# Patient Record
Sex: MALE | Race: WHITE
[De-identification: names, ages, dates, MRNs, and addresses within clinical notes are randomized per-mention and may not be internally consistent; named-entity substitution may affect disease eponyms.]

---

## 2019-03-05 ENCOUNTER — HOSPITAL ENCOUNTER (EMERGENCY)
Dept: HOSPITAL 97 - ER | Age: 64
Discharge: HOME | End: 2019-03-05
Payer: COMMERCIAL

## 2019-03-05 DIAGNOSIS — Z72.0: ICD-10-CM

## 2019-03-05 DIAGNOSIS — L02.31: Primary | ICD-10-CM

## 2019-03-05 DIAGNOSIS — F31.9: ICD-10-CM

## 2019-03-05 PROCEDURE — 99284 EMERGENCY DEPT VISIT MOD MDM: CPT

## 2019-03-05 PROCEDURE — 87070 CULTURE OTHR SPECIMN AEROBIC: CPT

## 2019-03-05 PROCEDURE — 0J990ZZ DRAINAGE OF BUTTOCK SUBCUTANEOUS TISSUE AND FASCIA, OPEN APPROACH: ICD-10-PCS

## 2019-03-05 PROCEDURE — 87205 SMEAR GRAM STAIN: CPT

## 2019-03-05 NOTE — EDPHYS
Physician Documentation                                                                           

 [facility]                                                                

Name: René Choudhary                                                                                  

Age: 63 yrs                                                                                       

Sex: Male                                                                                         

: 1955                                                                                   

MRN: [id number]                                                                                   

Arrival Date: 2019                                                                          

Time: 06:44                                                                                       

Account#: [id number]                                                                            

Bed 15                                                                                            

Private MD:                                                                                       

ED Physician Dillon Duran                                                                      

HPI:                                                                                              

                                                                                             

07:05 This 63 yrs old  Male presents to ER via Ambulatory with complaints of Boil.   kb  

07:05 The patient presents with an abscess of the gluteal cleft. Description: erythematous,   kb  

      fluctuant, swollen, warm. Onset: The symptoms/episode began/occurred 6 month(s) ago.        

      Possible cause(s): unknown. Associated signs and symptoms: Pertinent positives:             

      erythema, swelling, Pertinent negatives: discharge, drainage, foreign body sensation,       

      fever, headache, nausea, shortness of breath, vomiting. Modifying factors: the symptoms     

      are alleviated by nothing, the symptoms are aggravated by touching. Severity of             

      symptoms: At their worst the symptoms were moderate, in the emergency department the        

      symptoms are unchanged. The patient has not experienced similar symptoms in the past.       

      The patient has not recently seen a physician. "I have a boil on my ass. It's been          

      moving up and down the crack of my ass for 6 months. Now it is on the right side and it     

      started to hurt.".                                                                          

                                                                                                  

Historical:                                                                                       

- Allergies:                                                                                      

06:59 No Known Allergies;                                                                     bb  

- Home Meds:                                                                                      

06:59 Lamictal Oral [Active]; Zyprexa Oral [Active]; ibuprofen Oral [Active]; Lithium         bb  

      Carbonate Oral [Active]; Trazodone Oral [Active];                                           

- PMHx:                                                                                           

06:59 Bipolar disorder;                                                                       bb  

07:00 Anxiety;                                                                                rb1 

- PSHx:                                                                                           

06:59 Hernia repair;                                                                          bb  

                                                                                                  

- Immunization history:: Adult Immunizations up to date.                                          

- Social history:: Smoking status: Patient uses tobacco products, denies chronic                  

  smoking, but will smoke occasionally, Patient/guardian denies using alcohol.                    

- Ebola Screening: : No symptoms or risks identified at this time.                                

                                                                                                  

                                                                                                  

ROS:                                                                                              

07:03 Constitutional: Negative for fever, chills, and weight loss, Cardiovascular: Negative   kb  

      for chest pain, palpitations, and edema, Respiratory: Negative for shortness of breath,     

      cough, wheezing, and pleuritic chest pain, Abdomen/GI: Negative for abdominal pain,         

      nausea, vomiting, diarrhea, and constipation, MS/Extremity: Negative for injury and         

      deformity, Neuro: Negative for headache, weakness, numbness, tingling, and seizure.         

07:03 Skin: Positive for abscess, of the gluteal cleft.                                           

                                                                                                  

Exam:                                                                                             

07:03 Constitutional:  This is a well developed, well nourished patient who is awake, alert,  kb  

      and in no acute distress. Head/Face:  Normocephalic, atraumatic. Chest/axilla:  Normal      

      chest wall appearance and motion.  Nontender with no deformity.  No lesions are             

      appreciated. Cardiovascular:  Regular rate and rhythm with a normal S1 and S2.  No          

      gallops, murmurs, or rubs.  Normal PMI, no JVD.  No pulse deficits. Respiratory:  Lungs     

      have equal breath sounds bilaterally, clear to auscultation and percussion.  No rales,      

      rhonchi or wheezes noted.  No increased work of breathing, no retractions or nasal          

      flaring. Abdomen/GI:  Soft, non-tender, with normal bowel sounds.  No distension or         

      tympany.  No guarding or rebound.  No evidence of tenderness throughout. MS/ Extremity:     

       Pulses equal, no cyanosis.  Neurovascular intact.  Full, normal range of motion.           

      Neuro:  Awake and alert, GCS 15, oriented to person, place, time, and situation.            

      Cranial nerves II-XII grossly intact.  Motor strength 5/5 in all extremities.  Sensory      

      grossly intact.  Cerebellar exam normal.  Normal gait.                                      

07:03 Skin: abscess, that is moderate sized, of the gluteal cleft, with fluctuance, that is       

      moderate.                                                                                   

                                                                                                  

Vital Signs:                                                                                      

06:59  / 69; Pulse 72; Resp 16 S; Temp 98.1(O); Pulse Ox 98% on R/A; Weight 68.04 kg    bb  

      (R); Height 5 ft. 5 in. (165.10 cm) (R); Pain 5/10;                                         

08:15  / 73; Pulse 68; Resp 17; Pulse Ox 99% on R/A;                                    rb1 

06:59 Body Mass Index 24.96 (68.04 kg, 165.10 cm)                                             bb  

                                                                                                  

Procedures:                                                                                       

08:21 I \T\ D: Incision and drainage was performed for an abscess of the right gluteal cleft    kb

      Prepped with Betadine, Anesthetized with 2 ml's 1% Lidocaine. Incised with #11 blade.       

      Drained moderate amount purulent fluid. Packed with iodoform gauze, Dressing: sterile       

      4x4 gauze, the patient tolerated the procedure well.                                        

                                                                                                  

MDM:                                                                                              

06:56 Patient medically screened.                                                             kb  

07:03 Data reviewed: vital signs, nurses notes. Data interpreted: Pulse oximetry: on room air kb  

      is 98 %. Interpretation: normal.                                                            

07:38 ED course: Pt wants to wait for wife to arrive before I\T\D.                              kb

08:22 Counseling: I had a detailed discussion with the patient and/or guardian regarding: the kb  

      historical points, exam findings, and any diagnostic results supporting the                 

      discharge/admit diagnosis, the need for outpatient follow up, a general surgeon, to         

      return to the emergency department if symptoms worsen or persist or if there are any        

      questions or concerns that arise at home.                                                   

                                                                                                  

                                                                                             

08:22 Order name: Wound Culture                                                               kb  

                                                                                             

07:03 Order name: I\T\D Setup; Complete Time: 07:30                                             kb

                                                                                                  

Administered Medications:                                                                         

07:10 Drug: Bactrim (160 mg-800 mg (DS) 1 tablet Route: PO;                                   rb1 

07:35 Follow up: Response: No adverse reaction                                                rb1 

07:10 Drug: KeFLEX 500 mg Route: PO;                                                          rb1 

07:35 Follow up: Response: No adverse reaction                                                rb1 

07:20 CANCELLED (Other Intervention Used): Ativan 1 mg PO once                                kb  

07:22 Drug: Lidocaine (1 %) 5 mg Route: Infiltration;                                         rb1 

07:23 Drug: Ativan 0.5 mg Route: PO;                                                          rb1 

08:00 Follow up: Response: No adverse reaction; Anxiety decreased                             rb1 

                                                                                                  

                                                                                                  

Disposition:                                                                                      

19 08:23 Discharged to Home. Impression: Cutaneous abscess of buttock.                      

- Condition is Stable.                                                                            

- Discharge Instructions: Skin Abscess, Easy-to-Read, Incision and Drainage, Care After.          

- Prescriptions for Keflex 500 mg Oral Capsule - take 1 capsule by ORAL route every 8             

  hours for 10 days; 30 capsule. Bactrim - 160 mg Oral Tablet - take 1 tablet by            

  ORAL route every 12 hours for 10 days; 20 tablet.                                               

- Medication Reconciliation Form, Thank You Letter, Antibiotic Education, Prescription            

  Opioid Use form.                                                                                

- Follow up: Emergency Department; When: As needed; Reason: Worsening of condition.               

  Follow up: Private Physician; When: 2 - 3 days; Reason: Recheck today's complaints,             

  Continuance of care, Re-evaluation by your physician.                                           

                                                                                                  

                                                                                                  

                                                                                                  

Addendum:                                                                                         

2019                                                                                        

     11:15 Co-signature as Attending Physician, Dillon Duran MD I agree with the assessment and  c
ha

           plan of care.                                                                          

                                                                                                  

Signatures:                                                                                       

Dispatcher [employer]                           EDEva Finnegan, SUGAR-LAKEISHA WOOTEN-Dillon Salazar MD MD cha Ballard, Brenda, RN                     RN   bb                                                   

Apple Montero, RN                     RN   rb1                                                  

                                                                                                  

Corrections: (The following items were deleted from the chart)                                    

                                                                                             

07:20 07:13 Ativan 1 mg PO once ordered. kb                                                   kb  

08:42 08:23 2019 08:23 Discharged to Home. Impression: Cutaneous abscess of buttock.    rb1 

      Condition is Stable. Forms are Medication Reconciliation Form, Thank You Letter,            

      Antibiotic Education, Prescription Opioid Use. Follow up: Emergency Department; When:       

      As needed; Reason: Worsening of condition. Follow up: Private Physician; When: 2 - 3        

      days; Reason: Recheck today's complaints, Continuance of care, Re-evaluation by your        

      physician. kb                                                                               

                                                                                                  

**************************************************************************************************

## 2019-03-05 NOTE — ER
Nurse's Notes                                                                                     

 [facility]                                                                

Name: René Choudhary                                                                                  

Age: 63 yrs                                                                                       

Sex: Male                                                                                         

: 1955                                                                                   

MRN: [id number]                                                                                   

Arrival Date: 2019                                                                          

Time: 06:44                                                                                       

Account#: [id number]                                                                            

Bed 15                                                                                            

Private MD:                                                                                       

Diagnosis: Cutaneous abscess of buttock                                                           

                                                                                                  

Presentation:                                                                                     

                                                                                             

06:56 Presenting complaint: Patient states: he has had a boil on his buttocks for approx 6    bb  

      months which seems to have moved around and settled on his right sided gluteal cleft pt     

      states it was not hurting before but has now started hurting which is why he came in        

      now. Transition of care: patient was not received from another setting of care. Onset       

      of symptoms is unknown. Risk Assessment: Do you want to hurt yourself or someone else?      

      Patient reports no desire to harm self or others. Initial Sepsis Screen: Does the           

      patient meet any 2 criteria? No. Patient's initial sepsis screen is negative. Does the      

      patient have a suspected source of infection? No. Patient's initial sepsis screen is        

      negative. Care prior to arrival: None.                                                      

06:56 Method Of Arrival: Ambulatory                                                           bb  

06:56 Acuity: CARLY 4                                                                           bb  

                                                                                                  

Historical:                                                                                       

- Allergies:                                                                                      

06:59 No Known Allergies;                                                                     bb  

- Home Meds:                                                                                      

06:59 Lamictal Oral [Active]; Zyprexa Oral [Active]; ibuprofen Oral [Active]; Lithium         bb  

      Carbonate Oral [Active]; Trazodone Oral [Active];                                           

- PMHx:                                                                                           

06:59 Bipolar disorder;                                                                       bb  

07:00 Anxiety;                                                                                rb1 

- PSHx:                                                                                           

06:59 Hernia repair;                                                                          bb  

                                                                                                  

- Immunization history:: Adult Immunizations up to date.                                          

- Social history:: Smoking status: Patient uses tobacco products, denies chronic                  

  smoking, but will smoke occasionally, Patient/guardian denies using alcohol.                    

- Ebola Screening: : No symptoms or risks identified at this time.                                

                                                                                                  

                                                                                                  

Screenin:00 Abuse screen: Denies threats or abuse. Nutritional screening: No deficits noted.        rb1 

      Tuberculosis screening: No symptoms or risk factors identified. Fall Risk None              

      identified.                                                                                 

                                                                                                  

Assessment:                                                                                       

07:00 General: Appears comfortable, Behavior is anxious, Denies fever. Pain: Complains of     rb1 

      pain in buttocks Pain currently is 4 out of 10 on a pain scale. Neuro: Level of             

      Consciousness is awake, alert, obeys commands, Oriented to person, place, time,             

      situation. Cardiovascular: Capillary refill < 3 seconds is brisk in bilateral fingers.      

      Respiratory: Airway is patent Respiratory effort is even, unlabored, Respiratory            

      pattern is regular, symmetrical. GI: No signs and/or symptoms were reported involving       

      the gastrointestinal system. : No signs and/or symptoms were reported regarding the       

      genitourinary system. Derm: Skin is pink, warm \T\ dry. Pt. reports having a boil on his    

      right buttocks. Musculoskeletal: Range of motion: intact in all extremities.                

08:00 Reassessment: Wife arrived to drive the pt. home due to the Ativan 0.5 mg PO x once.    rb1 

08:00 Reassessment: Patient appears in no apparent distress at this time. Patient and/or      rb1 

      family updated on plan of care and expected duration. Pain level reassessed. Patient is     

      alert, oriented x 3, equal unlabored respirations, skin warm/dry/pink. Pt. reports          

      feeling less anxious.                                                                       

08:17 Reassessment: Provider at bedside performing I \T\ D.                                     rb1

                                                                                                  

Vital Signs:                                                                                      

06:59  / 69; Pulse 72; Resp 16 S; Temp 98.1(O); Pulse Ox 98% on R/A; Weight 68.04 kg    bb  

      (R); Height 5 ft. 5 in. (165.10 cm) (R); Pain 5/10;                                         

08:15  / 73; Pulse 68; Resp 17; Pulse Ox 99% on R/A;                                    rb1 

06:59 Body Mass Index 24.96 (68.04 kg, 165.10 cm)                                               

                                                                                                  

ED Course:                                                                                        

06:44 Patient arrived in ED.                                                                  es  

06:56 Eva Calderon FNP-C is PHCP.                                                        kb  

06:56 Dillon Duran MD is Attending Physician.                                             kb  

06:57 Triage completed.                                                                       bb  

06:59 Arm band placed on Patient placed in an exam room, on a stretcher.                      bb  

07:00 Patient has correct armband on for positive identification. Placed in gown. Bed in low  rb1 

      position. Call light in reach. Side rails up X 1. Pulse ox on. NIBP on.                     

07:06 Pamela Vera is Primary Nurse.                                                      cc3 

08:26 Assist provider with I \T\ D: of an abscess on Set up I\T\D tray. Performed by Eva      
5

      Kvng FNP-C Culture sent to lab. Wound packed. 4X4s, Dressing with Patient tolerated      

      well.                                                                                       

08:27 Wound Culture Sent.                                                                     [facility] 

08:42 Patient did not have IV access during this emergency room visit.                        rb1 

                                                                                                  

Administered Medications:                                                                         

07:10 Drug: Bactrim (160 mg-800 mg (DS) 1 tablet Route: PO;                                   rb1 

07:35 Follow up: Response: No adverse reaction                                                rb1 

07:10 Drug: KeFLEX 500 mg Route: PO;                                                          rb1 

07:35 Follow up: Response: No adverse reaction                                                rb1 

07:20 CANCELLED (Other Intervention Used): Ativan 1 mg PO once                                kb  

07:22 Drug: Lidocaine (1 %) 5 mg Route: Infiltration;                                         rb1 

07:23 Drug: Ativan 0.5 mg Route: PO;                                                          rb1 

08:00 Follow up: Response: No adverse reaction; Anxiety decreased                             rb1 

                                                                                                  

                                                                                                  

Outcome:                                                                                          

08:23 Discharge ordered by MD.                                                                kb  

08:42 Patient left the ED.                                                                    rb1 

08:42 Discharged to home ambulatory, with family.                                             rb1 

08:42 Condition: stable                                                                           

08:42 Discharge instructions given to patient, Instructed on discharge instructions, follow       

      up and referral plans. medication usage, Demonstrated understanding of instructions,        

      follow-up care, medications, Prescriptions given X 2.                                       

                                                                                                  

Signatures:                                                                                       

Eva Calderon, FNP-C                 FNP-Ckb                                                   

Emiliana Riojas Brenda, RN                     RN   bb                                                   

Apple Montero, RN                     RN   Kimberley Francisco                              Pamela Patterson                             3                                                  

                                                                                                  

**************************************************************************************************

## 2019-03-08 LAB
BUN BLD-MCNC: 16 MG/DL (ref 7–18)
GLUCOSE SERPLBLD-MCNC: 183 MG/DL (ref 74–106)
HCT VFR BLD CALC: 46.9 % (ref 39.6–49)
LYMPHOCYTES # SPEC AUTO: 1.8 K/UL (ref 0.7–4.9)
PMV BLD: 7.7 FL (ref 7.6–11.3)
POTASSIUM SERPL-SCNC: 4.2 MMOL/L (ref 3.5–5.1)
RBC # BLD: 5.04 M/UL (ref 4.33–5.43)

## 2019-03-08 NOTE — EKG
Test Date:    2019-03-08               Test Time:    14:19:41

Technician:   RICHARD                                     

                                                     

MEASUREMENT RESULTS:                                       

Intervals:                                           

Rate:         65                                     

MO:           138                                    

QRSD:         102                                    

QT:           380                                    

QTc:          395                                    

Axis:                                                

P:            65                                     

MO:           138                                    

QRS:          60                                     

T:            51                                     

                                                     

INTERPRETIVE STATEMENTS:                                       

                                                     

Normal sinus rhythm

Normal ECG

No previous ECG available for comparison



Electronically Signed On 03-08-19 15:35:04 CST by Chris Roijas

## 2019-03-08 NOTE — RAD REPORT
EXAM DESCRIPTION:  RAD - Chest Pa And Lat (2 Views) - 3/8/2019 2:42 pm

 

CLINICAL HISTORY:  pre op

Chest pain.

 

COMPARISON:  No comparisons

 

FINDINGS:  Diffuse COPD is present. Subtle linear opacities are seen in the left lung base posteriorl
y, likely representing atelectasis. An area of aspiration is also possible. The heart is upper limit 
normal in size. No displaced fractures.

## 2019-03-11 ENCOUNTER — HOSPITAL ENCOUNTER (OUTPATIENT)
Dept: HOSPITAL 97 - OR | Age: 64
Discharge: HOME | End: 2019-03-11
Attending: SURGERY
Payer: COMMERCIAL

## 2019-03-11 DIAGNOSIS — K61.0: Primary | ICD-10-CM

## 2019-03-11 DIAGNOSIS — L90.5: ICD-10-CM

## 2019-03-11 DIAGNOSIS — K64.8: ICD-10-CM

## 2019-03-11 DIAGNOSIS — K64.4: ICD-10-CM

## 2019-03-11 PROCEDURE — 87205 SMEAR GRAM STAIN: CPT

## 2019-03-11 PROCEDURE — 36415 COLL VENOUS BLD VENIPUNCTURE: CPT

## 2019-03-11 PROCEDURE — 93005 ELECTROCARDIOGRAM TRACING: CPT

## 2019-03-11 PROCEDURE — 46050 I&D PERIANAL ABSCESS SUPFC: CPT

## 2019-03-11 PROCEDURE — 80048 BASIC METABOLIC PNL TOTAL CA: CPT

## 2019-03-11 PROCEDURE — 71046 X-RAY EXAM CHEST 2 VIEWS: CPT

## 2019-03-11 PROCEDURE — 85025 COMPLETE CBC W/AUTO DIFF WBC: CPT

## 2019-03-11 PROCEDURE — 88304 TISSUE EXAM BY PATHOLOGIST: CPT

## 2019-03-11 PROCEDURE — 87075 CULTR BACTERIA EXCEPT BLOOD: CPT

## 2019-03-11 PROCEDURE — 87070 CULTURE OTHR SPECIMN AEROBIC: CPT

## 2019-03-11 PROCEDURE — 0D9Q3ZX DRAINAGE OF ANUS, PERCUTANEOUS APPROACH, DIAGNOSTIC: ICD-10-PCS

## 2019-03-11 NOTE — P.BOP
Preoperative diagnosis: perianal abscess


Postoperative diagnosis: same


Primary procedure: EUA, anoscopy, rigid proctoscopy, incision and drainage of 

perianal abscess


Estimated blood loss: <10cc


Specimen: pus and devitalized tissue


Findings: perianal abscess right posterolateral


Anesthesia: General


Complications: None


Transferred to: Recovery Room


Condition: Good

## 2019-03-12 NOTE — OP
Date of Procedure:  03/11/2019



Surgeon:  Bobby Calderon MD



Preoperative Diagnosis:  Perianal abscess.



Postoperative Diagnosis:  Perianal abscess.



Procedure:  Examination under anesthesia, anoscopy, and rigid proctoscopy incision and drainage of pe
rianal abscess.



Specimen:  Pus and devitalized tissue.



Findings:  Perianal abscess, right posterolateral.



Anesthesia:  General plus local.



Indications:  This is the case of a 63-year-old patient, comes to us with a perianal abscess.  The pa
dorothy went to the ER, received I and D of that area, but the wound was closed within 24 hours.  The s
welling in the foot is so inside with tenderness and he was offered EUA, anoscopy, proctoscopy, incis
ion and drainage of perianal abscess.  Once again, benefit, alternative, and risks of EUA, anoscopy, 
proctoscopy, incision and drainage of perianal abscess were fully explained, which include but are no
t limited to infection, bleeding, damage to adjacent structures, anesthesia complication, anal strict
ure and incontinence, bowel perforation, recurrence, MI, and even death.  He also understands this ma
y not relieve any symptoms, he might need more than one surgical intervention.  He understands he clara
l require wound care.



Procedure In Detail:  The patient was brought to operating room, placed in supine position.  Anesthes
ia was done without complication.  The patient was placed in lithotomy total position with proper pro
tection.  The area of the previous scar was noticed.  That I and D was closed.  Induration and fluctu
ance were still present.  We placed a rigid proctoscope in the area of the rectum all the way about t
o 10-12 cm limited by the amount of stools present up to that area.  We did not see any connection of
 this abscess until the rectum, we did not see any fistula or masses.  The scope was removed.  An ano
scope was placed with a window on the side once again to verify any connections with noticed internal
 and external hemorrhoids, but no tumors and no fistula.  At that moment, I proceeded to remove that,
 make an incision over the area of the fluctuance he has already previously marked.  Incision was car
ried down deep into the buttocks and perianal region when we found this cavity.  The cavity was found
, cultured, and irrigated.  Loculation was explored and packed with iodoform quarter of an inch.  The
 patient tolerated the procedure well.  Hemostasis was obtained.  Local anesthesia was applied after 
irrigation.  The patient was sent to recovery in stable condition.





CHINO/KAMALA

DD:  03/11/2019 14:26:15Voice ID:  [id number]

DT:  03/12/2019 01:58:38Report ID:  [id number]

## 2019-03-12 NOTE — DS
Date of Discharge:  03/11/2019



Diagnosis:  Perianal abscess.



Procedure:  EUA, anoscopy, rigid proctoscopy, incision and drainage of perianal abscess.



Disposition:  Home.



Discharge Instructions:  Activity as tolerated.  No heavy lifting. Follow up in my office in 1 week. 
Call for appointment, [phone].  Packing is iodoform quarter of an inch daily. Follow up in my office
 in 1week.  For medications, the patient is already taking antibiotics from home, so we are going to 
give him a Vicodin for pain and saline for dressing changes.





CHINO/KAMALA

DD:  03/11/2019 14:26:15Voice ID:  [id number]

DT:  03/12/2019 02:00:00Report ID:  [id number]

## 2020-01-27 ENCOUNTER — HOSPITAL ENCOUNTER (EMERGENCY)
Dept: HOSPITAL 97 - ER | Age: 65
Discharge: HOME | End: 2020-01-27
Payer: COMMERCIAL

## 2020-01-27 VITALS — SYSTOLIC BLOOD PRESSURE: 131 MMHG | DIASTOLIC BLOOD PRESSURE: 78 MMHG

## 2020-01-27 VITALS — OXYGEN SATURATION: 100 % | TEMPERATURE: 98 F

## 2020-01-27 DIAGNOSIS — G51.0: Primary | ICD-10-CM

## 2020-01-27 DIAGNOSIS — F31.9: ICD-10-CM

## 2020-01-27 PROCEDURE — 70551 MRI BRAIN STEM W/O DYE: CPT

## 2020-01-27 PROCEDURE — 96374 THER/PROPH/DIAG INJ IV PUSH: CPT

## 2020-01-27 PROCEDURE — 99284 EMERGENCY DEPT VISIT MOD MDM: CPT

## 2020-01-27 PROCEDURE — 96375 TX/PRO/DX INJ NEW DRUG ADDON: CPT

## 2020-01-27 PROCEDURE — 70450 CT HEAD/BRAIN W/O DYE: CPT

## 2020-01-27 NOTE — RAD REPORT
EXAM DESCRIPTION:  CT - Ct Stroke Brain Wo Cont - 1/27/2020 4:52 pm

 

CLINICAL HISTORY:  facial droop

Headache, drowsiness, CVA

 

COMPARISON:  No comparisons

 

TECHNIQUE:  All CT scans are performed using dose optimization technique as appropriate and may inclu
de automated exposure control or mA/KV adjustment according to patient size.

 

FINDINGS:  No intracranial hemorrhage, hydrocephalus or extra-axial fluid collection.5 mm area of dim
inished density right basal ganglia is present.No areas of brain edema or evidence of midline shift.

 

The paranasal sinuses and mastoids are clear. The calvarium is intact.

 

IMPRESSION:  Small 5 mm area of diminished density right basal ganglia is likely a small lacunar infa
rct, subacute or chronic timeframe favored. No acute hemorrhage seen.  If there is continued clinical
 concern for acute CVA, MR imaging of the brain would be recommended.

 

 The findings were discussed with ER physician Dr. Duran on 01/27/2020 at 5:04 P.m. by telephone.

## 2020-01-27 NOTE — RAD REPORT
EXAM DESCRIPTION:  MRI - Brain Wo Cont - 1/27/2020 6:13 pm

 

CLINICAL HISTORY:  left side facial droop;Bell's Palsy

Headache, drowsiness, CVA symptomology

 

COMPARISON:  Ct Stroke Brain Wo Cont dated 1/27/2020

 

TECHNIQUE:  Multi-sequence, multiplanar MR imaging of the brain was performed without contrast.

 

FINDINGS:  No intracranial hemorrhage, hydrocephalus or extra-axial fluid collections.Mild periventri
cular and deep white matter chronic microvascular ischemia seen. No edema or shift of midline structu
res. No findings to suspect brain mass. DWI is negative for acute CVA.

 

Midline structures are normally formed.

 

Mastoid air cells and paranasal sinuses are clear.

 

IMPRESSION:  Negative for acute CVA or other acute intracranial process.

## 2020-01-27 NOTE — EDPHYS
Physician Documentation                                                                           

 [facility]                                                                 

Name: René Choudhary                                                                                  

Age: 64 yrs                                                                                       

Sex: Male                                                                                         

: 1955                                                                                   

MRN: [id number]                                                                                   

Arrival Date: 2020                                                                          

Time: 16:31                                                                                       

Account#: [id number]                                                                            

Bed 24                                                                                            

Private MD:                                                                                       

ED Physician Dillon Duran                                                                      

HPI:                                                                                              

                                                                                             

16:59 This 64 yrs old  Male presents to ER via Ambulatory with complaints of Left    pm1 

      facial droop.                                                                               

16:59 The patient presents to the emergency department with left facial droop. Onset: The     pm1 

      symptoms/episode began/occurred yesterday, he noticed a little swelling to the left         

      side of his lip and then he started to have left sided droop. No extremity weakness r       

      numbness. Context: occurred at home. Associated signs and symptoms: Pertinent               

      positives: slurred speech, Pertinent negatives: fever, headache, nausea, paresthesias,      

      weakness. Severity of symptoms: Pain is currently a 0 / 10. Patient's baseline: Neuro:      

      alert and fully oriented, Motor: no deficits, Ambulation: walks without assistance,         

      Speech: normal. The patient has not experienced similar symptoms in the past.               

                                                                                                  

Historical:                                                                                       

- Allergies:                                                                                      

16:40 No Known Allergies;                                                                     tw2 

- Home Meds:                                                                                      

16:40 Lamictal 200 mg oral tab 1 tab once daily [Active]; trazodone 50 mg oral tab 1 tab 2    tw2 

      times per day [Active]; Zyprexa 2.5 mg oral tab 2 tabs once daily [Active]; Ibuprofen       

      Oral [Active];                                                                              

- PMHx:                                                                                           

16:40 Anxiety; Bipolar disorder;                                                              tw2 

- PSHx:                                                                                           

16:40 Hernia repair;                                                                          tw2 

                                                                                                  

- Immunization history:: Adult Immunizations.                                                     

- Coronavirus screen:: The patient has NOT traveled to [place], [place], or [place] in the           

  past 14 days.                                                                                   

- Social history:: Smoking status: .                                                              

- Ebola Screening: : Patient denies travel to an Ebola-affected area in the 21 days               

  before illness onset.                                                                           

                                                                                                  

                                                                                                  

ROS:                                                                                              

16:59 Constitutional: Negative for fever, chills, and weight loss, Eyes: Negative for injury, pm1 

      pain, redness, and discharge, ENT: Negative for injury, pain, and discharge, Neck:          

      Negative for injury, pain, and swelling, Cardiovascular: Negative for chest pain,           

      palpitations, and edema, Respiratory: Negative for shortness of breath, cough,              

      wheezing, and pleuritic chest pain, Abdomen/GI: Negative for abdominal pain, nausea,        

      vomiting, diarrhea, and constipation, Back: Negative for injury and pain, MS/Extremity:     

      Negative for injury and deformity, Skin: Negative for injury, rash, and discoloration.      

16:59 Neuro: Negative for altered mental status, dizziness, gait disturbance, weakness.           

                                                                                                  

Exam:                                                                                             

16:59 Constitutional:  This is a well developed, well nourished patient who is awake, alert,  pm1 

      and in no acute distress.                                                                   

16:59 ENT:  Nares patent. No nasal discharge, no septal abnormalities noted.  Tympanic            

      membranes are normal and external auditory canals are clear.  Oropharynx with no            

      redness, swelling, or masses, exudates, or evidence of obstruction, uvula midline.          

      Mucous membranes moist. Neck:  Trachea midline, no thyromegaly or masses palpated, and      

      no cervical lymphadenopathy.  Supple, full range of motion without nuchal rigidity, or      

      vertebral point tenderness.  No Meningismus. Chest/axilla:  Normal chest wall               

      appearance and motion.  Nontender with no deformity.  No lesions are appreciated.           

      Cardiovascular:  Regular rate and rhythm with a normal S1 and S2.  No gallops, murmurs,     

      or rubs.  Normal PMI, no JVD.  No pulse deficits. Respiratory:  Lungs have equal breath     

      sounds bilaterally, clear to auscultation and percussion.  No rales, rhonchi or wheezes     

      noted.  No increased work of breathing, no retractions or nasal flaring. Abdomen/GI:        

      Soft, non-tender, with normal bowel sounds.  No distension or tympany.  No guarding or      

      rebound.  No evidence of tenderness throughout. Back:  No spinal tenderness.  No            

      costovertebral tenderness.  Full range of motion. Skin:  Warm, dry with normal turgor.      

      Normal color with no rashes, no lesions, and no evidence of cellulitis. MS/ Extremity:      

      Pulses equal, no cyanosis.  Neurovascular intact.  Full, normal range of motion.            

16:59 Eyes: difficulty closing left eye lid fully.                                                

16:59 Neuro: Orientation: is normal, Mentation: is normal, Cerebellar function: normal finger     

      to nose testing, Motor: is normal, moves all fours, strength is normal, strength is 5/5     

      in all extremities, Sensation: is normal, no obvious gross deficits, Facial palsy with      

      forehead sparing on left side.  Smiling drawing to right side.  Difficulty with closing     

      left eyelid fully.                                                                          

                                                                                                  

Vital Signs:                                                                                      

16:38  / 77; Pulse 76; Resp 18; Temp 97.8(TE); Pulse Ox 99% on R/A; Weight 66.68 kg     tw2 

      (R); Height 5 ft. 5 in. (165.10 cm); Pain 0/10;                                             

17:30  / 87; Pulse 70; Resp 18; Temp 98; Pulse Ox 100% on R/A;                          mg2 

18:59  / 78; Pulse 80; Resp 18; Temp 98; Pulse Ox 100% on R/A;                          mg2 

16:38 Body Mass Index 24.46 (66.68 kg, 165.10 cm)                                             tw2 

                                                                                                  

NIH Stroke Scale Scores:                                                                          

18:16 NIHSS Score: 1                                                                          mg2 

                                                                                                  

MDM:                                                                                              

16:42 Patient medically screened.                                                             pm1 

17:14 ED course: No TPA indicated because clinically bells palsy.                             pm1 

18:40 Data reviewed: vital signs. Data interpreted: Pulse oximetry: on room air is 100 %.     pm1 

      Interpretation: normal. Counseling: I had a detailed discussion with the patient and/or     

      guardian regarding: the historical points, exam findings, and any diagnostic results        

      supporting the discharge/admit diagnosis, radiology results, the need for outpatient        

      follow up, for definitive care, a neurologist, to return to the emergency department if     

      symptoms worsen or persist or if there are any questions or concerns that arise at home.    

                                                                                                  

                                                                                             

16:44 Order name: CT Stroke Brain w/o Contrast; Complete Time: 17:14                          pm                                                                                             

16:44 Order name: Accucheck                                                                   pm                                                                                             

16:44 Order name: Cardiac monitoring                                                                                                                                                       

16:44 Order name: EKG - Nurse/Tech                                                            pm                                                                                             

17:16 Order name: MRI - Brain Wo Cont; Complete Time: 18:29                                   pm1 

                                                                                             

16:44 Order name: IV Saline Lock                                                              pm                                                                                             

16:44 Order name: Labs collected and sent                                                     pm                                                                                             

16:44 Order name: NPO                                                                         pm                                                                                             

16:44 Order name: O2 Per Protocol                                                             pm                                                                                             

16:44 Order name: O2 Sat Monitoring                                                           pm1 

                                                                                                  

Administered Medications:                                                                         

17:43 Drug: Ativan 0.5 mg Route: IVP; Site: right forearm;                                    mg2 

18:25 Follow up: Response: No adverse reaction                                                mg2 

18:42 Drug: Decadron - Dexamethasone 10 mg Route: IVP; Site: right forearm;                   mg2 

18:42 Follow up: Response: No adverse reaction; Medication administered at discharge.         mg2 

18:59 Drug: Valtrex 1000 mg Route: PO;                                                        mg2 

18:59 Follow up: Response: No adverse reaction; Medication administered at discharge.         mg2 

                                                                                                  

                                                                                                  

Disposition:                                                                                      

                                                                                             

08:43 Co-signature as Attending Physician, Dillon Duran MD I agree with the assessment and  emily 

      plan of care.                                                                               

                                                                                                  

Disposition:                                                                                      

20 18:41 Discharged to Home. Impression: Bell's palsy.                                      

- Condition is Stable.                                                                            

- Discharge Instructions: Ruiz Palsy, Adult.                                                      

- Prescriptions for prednisone 10 mg Oral tablet - take 1 tablet by ORAL route as                 

  directed Take 6 tablets once daily for 3 days, then take 4 tablets once daily for 3             

  days, then take 2 tablets once daily for 2 days, then 1 tablet once daily for 2 days;           

  36 tablet. Valtrex 1 g Oral Tablet - take 1 tablet by ORAL route every 8 hours for 7            

  days; 21 tablet.                                                                                

- Medication Reconciliation Form, Thank You Letter, Antibiotic Education, Prescription            

  Opioid Use form.                                                                                

- Follow up: Emergency Department; When: As needed; Reason: Worsening of condition.               

  Follow up: Private Physician; When: 2 - 3 days; Reason: Recheck today's complaints,             

  Continuance of care, Re-evaluation by your physician. Follow up: Amandeep Ochoa MD; When: 2 - 3 days; Reason: Recheck today's complaints, Continuance of care,                  

  Re-evaluation by your physician.                                                                

- Problem is new.                                                                                 

- Symptoms have improved.                                                                         

                                                                                                  

                                                                                                  

                                                                                                  

                                                                                                  

NIH Stroke Scale - NIH Stroke Score                                                               

Date: 2020                                                                                  

Time: 18:16                                                                                       

Total Score = 1                                                                                   

  1a. Level of Consciousness (LOC) - 0(Alert)                                                     

  1b. Level of Consciousness (LOC) (Year \T\ Age) - 0(Both)                                       

  1c. LOC Commands (Open \T\ Closes Eyes/) - 0(Both)                                          

   2. Best Gaze (Lateral Gaze Paresis) - 0(Normal)                                                

   3. Visual Field Loss - 0(No visual loss)                                                       

   4. Facial Palsy - 1(Minor Paralysis)                                                           

  5a. Left Arm: Motor (10-second hold) - 0(No drift)                                              

  5b. Right Arm: Motor (10-second hold) - 0(No drift)                                             

  6a. Left Leg: Motor (5-second hold - always test supine) - 0(No drift)                          

  6b. Right Leg: Motor (5-second hold - always test supine) - 0(No drift)                         

   7. Limb Ataxia (finger/nose \T\ heel/shin - test with eyes open) - 0(Absent)                   

   8. Sensory Loss (pinprick arms/legs/face) - 0(Normal)                                          

   9. Best Language: Aphasia (description/naming/reading) - 0(No aphasia)                         

  10. Dysarthria (speech clarity - read or repeat words) - 0(Normal)                              

  11. Extinction and Inattention (visual/tactile/auditory/spatial/personal) - 0(No                

      abnormality)                                                                                

Initials: mg2                                                                                     

                                                                                                  

Signatures:                                                                                       

Dispatcher [employer]                           EDMS                                                 

Dillon Duran MD MD cha Marinas, Patrick, NP                    NP   pm1                                                  

Sade Pena, CARMEN                          RN   tw2                                                  

Mohit Bradley RN                    RN   mg2                                                  

                                                                                                  

Corrections: (The following items were deleted from the chart)                                    

                                                                                             

17:04 16:44 Stroke Swallow Screen ordered. pm1                                        pm1         

17:05 16:46 Chest Single View+RAD.RAD.BRZ ordered. EDMS                               EDMS        

18:43 16:45 BASIC METABOLIC PANEL+C.LAB.BRZ ordered. EDMS                             EDMS        

18:43 16:46 CBC+H.LAB.BRZ ordered. EDMS                                               EDMS        

18:43 16:46 PROTIME (+INR)+COAG.LAB.BRZ ordered. EDMS                                 EDMS        

18:43 16:46 PTT, ACTIVATED+COAG.LAB.BRZ ordered. EDMS                                 EDMS        

18:50 18:41 2020 18:41 Discharged to Home. Impression: Bell's palsy. Condition  pm1         

      is Stable. Forms are Medication Reconciliation Form, Thank You Letter,                      

      Antibiotic Education, Prescription Opioid Use. Follow up: Emergency Department;             

      When: As needed; Reason: Worsening of condition. Follow up: Private Physician;              

      When: 2 - 3 days; Reason: Recheck today's complaints, Continuance of care,                  

      Re-evaluation by your physician. Problem is new. Symptoms have improved. pm1                

19:00 18:50 2020 18:41 Discharged to Home. Impression: Bell's palsy. Condition  mg2         

      is Stable. Discharge Instructions: Ruiz Palsy, Adult. Prescriptions for                     

      prednisone 10 mg Oral tablet - take 1 tablet by ORAL route as directed Take 6               

      tablets once daily for 3 days, then take 4 tablets once daily for 3 days, then              

      take 2 tablets once daily for 2 days, then 1 tablet once daily for 2 days; 36               

      tablet, Valtrex 1 g Oral Tablet - take 1 tablet by ORAL route every 8 hours for             

      7 days; 21 tablet. and Forms are Medication Reconciliation Form, Thank You                  

      Letter, Antibiotic Education, Prescription Opioid Use. Follow up: Emergency                 

      Department; When: As needed; Reason: Worsening of condition. Follow up: Private             

      Physician; When: 2 - 3 days; Reason: Recheck today's complaints, Continuance of             

      care, Re-evaluation by your physician. Follow up: Amandeep Ochoa; When: 2 - 3              

      days; Reason: Recheck today's complaints, Continuance of care, Re-evaluation by             

      your physician. Problem is new. Symptoms have improved. pm1                                 

                                                                                                  

**************************************************************************************************

## 2020-01-27 NOTE — ER
Nurse's Notes                                                                                     

 [facility]                                                                 

Name: René Choudhary                                                                                  

Age: 64 yrs                                                                                       

Sex: Male                                                                                         

: 1955                                                                                   

MRN: [id number]                                                                                   

Arrival Date: 2020                                                                          

Time: 16:31                                                                                       

Account#: [id number]                                                                            

Bed 24                                                                                            

Private MD:                                                                                       

Diagnosis: Bell's palsy                                                                           

                                                                                                  

Presentation:                                                                                     

                                                                                             

16:36 Presenting complaint: Patient states: i day ago i noticed my lip was swollen, but since tw2 

      3pm i started with a left sided facial droop and some slurred speech, no weakness in        

      arms or legs. Transition of care: patient was not received from another setting of          

      care. Onset of symptoms was 2020. Risk Assessment: Do you want to hurt          

      yourself or someone else? Patient reports no desire to harm self or others. Initial         

      Sepsis Screen: Does the patient meet any 2 criteria? No. Patient's initial sepsis           

      screen is negative. Does the patient have a suspected source of infection? No.              

      Patient's initial sepsis screen is negative. Care prior to arrival: None.                   

16:36 Method Of Arrival: Ambulatory                                                           tw2 

16:36 Acuity: CARLY 3                                                                           tw2 

                                                                                                  

Triage Assessment:                                                                                

16:40 The onset of the patients symptoms was 2020 at 15:00. General: Appears in   tw2 

      no apparent distress. slender, well groomed, Behavior is calm, cooperative, appropriate     

      for age. Pain: Denies pain. Neuro: Reports slurred speech and left facial droop since       

      3pm.                                                                                        

                                                                                                  

Stroke Activation: Symptom onset < 3 hours                                                        

 Physician: Stroke Attending; Name: ; Notified At: ; Arrived At:                                  

 Physician: Chief Stroke Resident; Name: ; Notified At: ; Arrived At:                             

 Physician: Stroke Resident; Name: ; Notified At: ; Arrived At:                                   

 Physician: ED Attending; Name: ; Notified At: ; Arrived At:                                      

 Physician: ED Resident; Name: ; Notified At: ; Arrived At:                                       

                                                                                                  

Historical:                                                                                       

- Allergies:                                                                                      

16:40 No Known Allergies;                                                                     tw2 

- Home Meds:                                                                                      

16:40 Lamictal 200 mg oral tab 1 tab once daily [Active]; trazodone 50 mg oral tab 1 tab 2    tw2 

      times per day [Active]; Zyprexa 2.5 mg oral tab 2 tabs once daily [Active]; Ibuprofen       

      Oral [Active];                                                                              

- PMHx:                                                                                           

16:40 Anxiety; Bipolar disorder;                                                              tw2 

- PSHx:                                                                                           

16:40 Hernia repair;                                                                          tw2 

                                                                                                  

- Immunization history:: Adult Immunizations.                                                     

- Coronavirus screen:: The patient has NOT traveled to [place], [place], or [place] in the           

  past 14 days.                                                                                   

- Social history:: Smoking status: .                                                              

- Ebola Screening: : Patient denies travel to an Ebola-affected area in the 21 days               

  before illness onset.                                                                           

                                                                                                  

                                                                                                  

Screenin:13 Abuse screen: Denies threats or abuse. Nutritional screening: No deficits noted.        tw2 

      Tuberculosis screening: No symptoms or risk factors identified. Fall Risk None              

      identified.                                                                                 

                                                                                                  

Assessment:                                                                                       

17:00 General: Appears in no apparent distress. comfortable, Behavior is calm, cooperative.   mg2 

      Pain: Denies pain. Neuro: Level of Consciousness is awake, alert, obeys commands,           

      Oriented to person, place, time, situation. Neuro: Cardiovascular: Capillary refill < 3     

      seconds Patient's skin is warm and dry. Respiratory: Airway is patent Respiratory           

      effort is even, unlabored, Respiratory pattern is regular, symmetrical. GI: No signs        

      and/or symptoms were reported involving the gastrointestinal system. : No signs           

      and/or symptoms were reported regarding the genitourinary system. EENT: No signs and/or     

      symptoms were reported regarding the EENT system. Derm: Skin is intact, is healthy with     

      good turgor, Skin is pink, warm \T\ dry. normal.                                            

18:16 VAN Scoring: Arm Drift: Patients demonstrates NO arm weakness. Patient is VAN Negative. mg2 

      Visual Disturbance: No visual disturbance noted. Aphasia: No aphasia noted. Neglect: No     

      neglect noted.                                                                              

18:25 Reassessment: provider cancelled the blood work.                                        mg2 

18:59 Reassessment: Patient appears in no apparent distress at this time.                     mg2 

                                                                                                  

Vital Signs:                                                                                      

16:38  / 77; Pulse 76; Resp 18; Temp 97.8(TE); Pulse Ox 99% on R/A; Weight 66.68 kg     tw2 

      (R); Height 5 ft. 5 in. (165.10 cm); Pain 0/10;                                             

17:30  / 87; Pulse 70; Resp 18; Temp 98; Pulse Ox 100% on R/A;                          mg2 

18:59  / 78; Pulse 80; Resp 18; Temp 98; Pulse Ox 100% on R/A;                          mg2 

16:38 Body Mass Index 24.46 (66.68 kg, 165.10 cm)                                             tw2 

                                                                                                  

NIH Stroke Scale Scores:                                                                          

18:16 NIHSS Score: 1                                                                          mg2 

                                                                                                  

ED Course:                                                                                        

16:31 Patient arrived in ED.                                                                  mr  

16:37 Triage completed.                                                                       tw2 

16:37 Arm band placed on.                                                                     tw2 

16:38 Adult w/ patient. pt transported to CT at this time.                                    tw2 

16:42 Mukund Shetty, ADALID is PHCP.                                                           pm1 

16:42 Dillon Duran MD is Attending Physician.                                             pm1 

16:53 CT Stroke Brain w/o Contrast In Process Unspecified.                                    EDMS

17:30 Inserted saline lock: 20 gauge in right forearm, using aseptic technique.               mg2 

17:31 Mohit Bradley, RN is Primary Nurse.                                                  mg2 

18:17 MRI - Brain Wo Cont In Process Unspecified.                                             EDMS

18:18 No provider procedures requiring assistance completed.                                  mg2 

18:50 Amandeep Ochoa MD is Referral Physician.                                             pm1 

18:59 IV discontinued, intact, bleeding controlled, No redness/swelling at site. Pressure     mg2 

      dressing applied.                                                                           

                                                                                                  

Administered Medications:                                                                         

17:43 Drug: Ativan 0.5 mg Route: IVP; Site: right forearm;                                    mg2 

18:25 Follow up: Response: No adverse reaction                                                mg2 

18:42 Drug: Decadron - Dexamethasone 10 mg Route: IVP; Site: right forearm;                   mg2 

18:42 Follow up: Response: No adverse reaction; Medication administered at discharge.         mg2 

18:59 Drug: Valtrex 1000 mg Route: PO;                                                        mg2 

18:59 Follow up: Response: No adverse reaction; Medication administered at discharge.         mg2 

                                                                                                  

                                                                                                  

Outcome:                                                                                          

18:41 Discharge ordered by MD.                                                                pm1 

18:59 Discharged to home ambulatory, with family.                                             mg2 

18:59 Condition: stable                                                                           

18:59 Discharge instructions given to patient, family, Instructed on discharge instructions,      

      follow up and referral plans. medication usage, Demonstrated understanding of               

      instructions, follow-up care, medications.                                                  

18:59 Prescriptions given X 2.                                                                    

19:00 Patient left the ED.                                                                    mg2 

                                                                                                  

                                                                                                  

NIH Stroke Scale - NIH Stroke Score                                                               

Date: 2020                                                                                  

Time: 18:16                                                                                       

Total Score = 1                                                                                   

  1a. Level of Consciousness (LOC) - 0(Alert)                                                     

  1b. Level of Consciousness (LOC) (Year \T\ Age) - 0(Both)                                       

  1c. LOC Commands (Open \T\ Closes Eyes/) - 0(Both)                                          

   2. Best Gaze (Lateral Gaze Paresis) - 0(Normal)                                                

   3. Visual Field Loss - 0(No visual loss)                                                       

   4. Facial Palsy - 1(Minor Paralysis)                                                           

  5a. Left Arm: Motor (10-second hold) - 0(No drift)                                              

  5b. Right Arm: Motor (10-second hold) - 0(No drift)                                             

  6a. Left Leg: Motor (5-second hold - always test supine) - 0(No drift)                          

  6b. Right Leg: Motor (5-second hold - always test supine) - 0(No drift)                         

   7. Limb Ataxia (finger/nose \T\ heel/shin - test with eyes open) - 0(Absent)                   

   8. Sensory Loss (pinprick arms/legs/face) - 0(Normal)                                          

   9. Best Language: Aphasia (description/naming/reading) - 0(No aphasia)                         

  10. Dysarthria (speech clarity - read or repeat words) - 0(Normal)                              

  11. Extinction and Inattention (visual/tactile/auditory/spatial/personal) - 0(No                

      abnormality)                                                                                

Initials: mg2                                                                                     

                                                                                                  

Signatures:                                                                                       

Dispatcher [employer]                           Danette Flynn Patrick, ADALID                    NP   pm1                                                  

Sade Pena, CARMEN                          RN   tw2                                                  

Mohit Bradley RN                    RN   mg2                                                  

                                                                                                  

**************************************************************************************************